# Patient Record
Sex: MALE | Race: WHITE | NOT HISPANIC OR LATINO | Employment: FULL TIME | URBAN - NONMETROPOLITAN AREA
[De-identification: names, ages, dates, MRNs, and addresses within clinical notes are randomized per-mention and may not be internally consistent; named-entity substitution may affect disease eponyms.]

---

## 2022-04-04 ENCOUNTER — OFFICE VISIT (OUTPATIENT)
Dept: LAB | Facility: HOSPITAL | Age: 62
End: 2022-04-04
Attending: STUDENT IN AN ORGANIZED HEALTH CARE EDUCATION/TRAINING PROGRAM
Payer: COMMERCIAL

## 2022-04-04 DIAGNOSIS — Z01.818 PRE-OP TESTING: ICD-10-CM

## 2022-04-04 PROCEDURE — 93005 ELECTROCARDIOGRAM TRACING: CPT

## 2022-04-05 LAB
ATRIAL RATE: 77 BPM
P AXIS: 73 DEGREES
PR INTERVAL: 158 MS
QRS AXIS: 61 DEGREES
QRSD INTERVAL: 84 MS
QT INTERVAL: 378 MS
QTC INTERVAL: 427 MS
T WAVE AXIS: 54 DEGREES
VENTRICULAR RATE: 77 BPM

## 2022-04-20 ENCOUNTER — ANESTHESIA EVENT (OUTPATIENT)
Dept: PERIOP | Facility: HOSPITAL | Age: 62
End: 2022-04-20
Payer: COMMERCIAL

## 2022-04-20 RX ORDER — ASPIRIN 81 MG/1
81 TABLET ORAL DAILY
COMMUNITY

## 2022-04-20 RX ORDER — ATORVASTATIN CALCIUM 20 MG/1
20 TABLET, FILM COATED ORAL DAILY
COMMUNITY

## 2022-04-20 RX ORDER — METHOCARBAMOL 750 MG/1
750 TABLET, FILM COATED ORAL 3 TIMES DAILY
COMMUNITY

## 2022-04-20 RX ORDER — DULOXETIN HYDROCHLORIDE 20 MG/1
20 CAPSULE, DELAYED RELEASE ORAL DAILY
COMMUNITY

## 2022-04-20 RX ORDER — GABAPENTIN 600 MG/1
600 TABLET ORAL 3 TIMES DAILY
COMMUNITY

## 2022-04-20 RX ORDER — LISINOPRIL 10 MG/1
10 TABLET ORAL DAILY
COMMUNITY

## 2022-04-20 RX ORDER — ZOLPIDEM TARTRATE 10 MG/1
10 TABLET ORAL
COMMUNITY

## 2022-04-20 NOTE — PRE-PROCEDURE INSTRUCTIONS
Pre-Surgery Instructions:   Medication Instructions    aspirin (ECOTRIN LOW STRENGTH) 81 mg EC tablet Pt was instructed to stop from today 4/20 until after sx    atorvastatin (LIPITOR) 20 mg tablet Take day of surgery   DULoxetine (CYMBALTA) 20 mg capsule Take day of surgery   gabapentin (NEURONTIN) 600 MG tablet Take day of surgery   lisinopril (ZESTRIL) 10 mg tablet Hold day of surgery   methocarbamol (ROBAXIN) 750 mg tablet Take day of surgery   zolpidem (Ambien) 10 mg tablet Take day of surgery  Pt takes most medications in the afternoon but will be taking gabapentin and robaxin with a small sip of water
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
## 2022-04-22 ENCOUNTER — ANESTHESIA (OUTPATIENT)
Dept: PERIOP | Facility: HOSPITAL | Age: 62
End: 2022-04-22
Payer: COMMERCIAL

## 2022-04-22 ENCOUNTER — HOSPITAL ENCOUNTER (OUTPATIENT)
Facility: HOSPITAL | Age: 62
Setting detail: OUTPATIENT SURGERY
Discharge: HOME | End: 2022-04-22
Attending: STUDENT IN AN ORGANIZED HEALTH CARE EDUCATION/TRAINING PROGRAM | Admitting: STUDENT IN AN ORGANIZED HEALTH CARE EDUCATION/TRAINING PROGRAM
Payer: COMMERCIAL

## 2022-04-22 VITALS
OXYGEN SATURATION: 94 % | WEIGHT: 162 LBS | HEART RATE: 77 BPM | SYSTOLIC BLOOD PRESSURE: 142 MMHG | BODY MASS INDEX: 21.94 KG/M2 | RESPIRATION RATE: 18 BRPM | HEIGHT: 72 IN | DIASTOLIC BLOOD PRESSURE: 77 MMHG | TEMPERATURE: 97.8 F

## 2022-04-22 DIAGNOSIS — K43.9 VENTRAL HERNIA WITHOUT OBSTRUCTION OR GANGRENE: Primary | ICD-10-CM

## 2022-04-22 DIAGNOSIS — R22.2 ABDOMINAL WALL MASS: ICD-10-CM

## 2022-04-22 PROBLEM — F17.200 SMOKING: Status: ACTIVE | Noted: 2022-04-22

## 2022-04-22 PROBLEM — J44.9 CHRONIC OBSTRUCTIVE PULMONARY DISEASE (HCC): Status: ACTIVE | Noted: 2022-04-22

## 2022-04-22 PROBLEM — I10 PRIMARY HYPERTENSION: Status: ACTIVE | Noted: 2022-04-22

## 2022-04-22 PROBLEM — E78.5 HYPERLIPIDEMIA: Status: ACTIVE | Noted: 2022-04-22

## 2022-04-22 PROBLEM — G89.29 CHRONIC PAIN: Status: ACTIVE | Noted: 2022-04-22

## 2022-04-22 PROCEDURE — 88302 TISSUE EXAM BY PATHOLOGIST: CPT | Performed by: PATHOLOGY

## 2022-04-22 PROCEDURE — C1781 MESH (IMPLANTABLE): HCPCS | Performed by: STUDENT IN AN ORGANIZED HEALTH CARE EDUCATION/TRAINING PROGRAM

## 2022-04-22 DEVICE — VENTRALIGHT™ ST MESH WITH ECHO 2™ POSITIONING SYSTEM15 CM / 6“ CIRCLE
Type: IMPLANTABLE DEVICE | Status: FUNCTIONAL
Brand: VENTRALIGHT ST MESH WITH ECHO 2 POSITIONING

## 2022-04-22 RX ORDER — ALBUTEROL SULFATE 2.5 MG/3ML
SOLUTION RESPIRATORY (INHALATION) AS NEEDED
Status: DISCONTINUED | OUTPATIENT
Start: 2022-04-22 | End: 2022-04-22

## 2022-04-22 RX ORDER — MAGNESIUM HYDROXIDE 1200 MG/15ML
LIQUID ORAL AS NEEDED
Status: DISCONTINUED | OUTPATIENT
Start: 2022-04-22 | End: 2022-04-22 | Stop reason: HOSPADM

## 2022-04-22 RX ORDER — DIPHENHYDRAMINE HYDROCHLORIDE 50 MG/ML
12.5 INJECTION INTRAMUSCULAR; INTRAVENOUS ONCE AS NEEDED
Status: DISCONTINUED | OUTPATIENT
Start: 2022-04-22 | End: 2022-04-22 | Stop reason: HOSPADM

## 2022-04-22 RX ORDER — MIDAZOLAM HYDROCHLORIDE 2 MG/2ML
INJECTION, SOLUTION INTRAMUSCULAR; INTRAVENOUS AS NEEDED
Status: DISCONTINUED | OUTPATIENT
Start: 2022-04-22 | End: 2022-04-22

## 2022-04-22 RX ORDER — ONDANSETRON 2 MG/ML
4 INJECTION INTRAMUSCULAR; INTRAVENOUS ONCE AS NEEDED
Status: DISCONTINUED | OUTPATIENT
Start: 2022-04-22 | End: 2022-04-22 | Stop reason: HOSPADM

## 2022-04-22 RX ORDER — ROCURONIUM BROMIDE 10 MG/ML
INJECTION, SOLUTION INTRAVENOUS AS NEEDED
Status: DISCONTINUED | OUTPATIENT
Start: 2022-04-22 | End: 2022-04-22

## 2022-04-22 RX ORDER — FENTANYL CITRATE 50 UG/ML
INJECTION, SOLUTION INTRAMUSCULAR; INTRAVENOUS AS NEEDED
Status: DISCONTINUED | OUTPATIENT
Start: 2022-04-22 | End: 2022-04-22

## 2022-04-22 RX ORDER — DEXAMETHASONE SODIUM PHOSPHATE 10 MG/ML
INJECTION, SOLUTION INTRAMUSCULAR; INTRAVENOUS AS NEEDED
Status: DISCONTINUED | OUTPATIENT
Start: 2022-04-22 | End: 2022-04-22

## 2022-04-22 RX ORDER — DEXMEDETOMIDINE HYDROCHLORIDE 100 UG/ML
INJECTION, SOLUTION INTRAVENOUS AS NEEDED
Status: DISCONTINUED | OUTPATIENT
Start: 2022-04-22 | End: 2022-04-22

## 2022-04-22 RX ORDER — LIDOCAINE HYDROCHLORIDE 10 MG/ML
INJECTION, SOLUTION EPIDURAL; INFILTRATION; INTRACAUDAL; PERINEURAL AS NEEDED
Status: DISCONTINUED | OUTPATIENT
Start: 2022-04-22 | End: 2022-04-22

## 2022-04-22 RX ORDER — BUPIVACAINE HYDROCHLORIDE AND EPINEPHRINE 2.5; 5 MG/ML; UG/ML
INJECTION, SOLUTION EPIDURAL; INFILTRATION; INTRACAUDAL; PERINEURAL AS NEEDED
Status: DISCONTINUED | OUTPATIENT
Start: 2022-04-22 | End: 2022-04-22 | Stop reason: HOSPADM

## 2022-04-22 RX ORDER — FENTANYL CITRATE/PF 50 MCG/ML
25 SYRINGE (ML) INJECTION
Status: DISCONTINUED | OUTPATIENT
Start: 2022-04-22 | End: 2022-04-22 | Stop reason: HOSPADM

## 2022-04-22 RX ORDER — HYDROMORPHONE HCL/PF 1 MG/ML
0.5 SYRINGE (ML) INJECTION
Status: DISCONTINUED | OUTPATIENT
Start: 2022-04-22 | End: 2022-04-22 | Stop reason: HOSPADM

## 2022-04-22 RX ORDER — HYDROMORPHONE HCL/PF 1 MG/ML
SYRINGE (ML) INJECTION AS NEEDED
Status: DISCONTINUED | OUTPATIENT
Start: 2022-04-22 | End: 2022-04-22

## 2022-04-22 RX ORDER — PROPOFOL 10 MG/ML
INJECTION, EMULSION INTRAVENOUS AS NEEDED
Status: DISCONTINUED | OUTPATIENT
Start: 2022-04-22 | End: 2022-04-22

## 2022-04-22 RX ORDER — ALBUTEROL SULFATE 2.5 MG/3ML
2.5 SOLUTION RESPIRATORY (INHALATION) ONCE AS NEEDED
Status: COMPLETED | OUTPATIENT
Start: 2022-04-22 | End: 2022-04-22

## 2022-04-22 RX ORDER — SODIUM CHLORIDE, SODIUM LACTATE, POTASSIUM CHLORIDE, CALCIUM CHLORIDE 600; 310; 30; 20 MG/100ML; MG/100ML; MG/100ML; MG/100ML
INJECTION, SOLUTION INTRAVENOUS CONTINUOUS PRN
Status: DISCONTINUED | OUTPATIENT
Start: 2022-04-22 | End: 2022-04-22

## 2022-04-22 RX ORDER — EPHEDRINE SULFATE 50 MG/ML
INJECTION INTRAVENOUS AS NEEDED
Status: DISCONTINUED | OUTPATIENT
Start: 2022-04-22 | End: 2022-04-22

## 2022-04-22 RX ORDER — CEFAZOLIN SODIUM 1 G/50ML
1000 SOLUTION INTRAVENOUS ONCE
Status: COMPLETED | OUTPATIENT
Start: 2022-04-22 | End: 2022-04-22

## 2022-04-22 RX ORDER — OXYCODONE HYDROCHLORIDE 5 MG/1
5 TABLET ORAL EVERY 4 HOURS PRN
Qty: 10 TABLET | Refills: 0 | Status: SHIPPED | OUTPATIENT
Start: 2022-04-22 | End: 2022-05-02

## 2022-04-22 RX ORDER — ALBUTEROL SULFATE 2.5 MG/3ML
2.5 SOLUTION RESPIRATORY (INHALATION) ONCE
Status: DISCONTINUED | OUTPATIENT
Start: 2022-04-22 | End: 2022-04-22 | Stop reason: HOSPADM

## 2022-04-22 RX ORDER — LABETALOL HYDROCHLORIDE 5 MG/ML
20 INJECTION, SOLUTION INTRAVENOUS
Status: DISCONTINUED | OUTPATIENT
Start: 2022-04-22 | End: 2022-04-22 | Stop reason: HOSPADM

## 2022-04-22 RX ORDER — KETOROLAC TROMETHAMINE 30 MG/ML
INJECTION, SOLUTION INTRAMUSCULAR; INTRAVENOUS AS NEEDED
Status: DISCONTINUED | OUTPATIENT
Start: 2022-04-22 | End: 2022-04-22

## 2022-04-22 RX ORDER — ALBUTEROL SULFATE 90 UG/1
AEROSOL, METERED RESPIRATORY (INHALATION) AS NEEDED
Status: DISCONTINUED | OUTPATIENT
Start: 2022-04-22 | End: 2022-04-22

## 2022-04-22 RX ADMIN — HYDROMORPHONE HYDROCHLORIDE 0.25 MG: 1 INJECTION, SOLUTION INTRAMUSCULAR; INTRAVENOUS; SUBCUTANEOUS at 12:18

## 2022-04-22 RX ADMIN — SUGAMMADEX 200 MG: 100 INJECTION, SOLUTION INTRAVENOUS at 12:23

## 2022-04-22 RX ADMIN — ALBUTEROL SULFATE 2.5 MG: 2.5 SOLUTION RESPIRATORY (INHALATION) at 09:57

## 2022-04-22 RX ADMIN — DEXMEDETOMIDINE HCL 8 MCG: 100 INJECTION INTRAVENOUS at 10:19

## 2022-04-22 RX ADMIN — ALBUTEROL SULFATE 4 PUFF: 90 AEROSOL, METERED RESPIRATORY (INHALATION) at 12:21

## 2022-04-22 RX ADMIN — DEXMEDETOMIDINE HCL 12 MCG: 100 INJECTION INTRAVENOUS at 10:06

## 2022-04-22 RX ADMIN — ROCURONIUM BROMIDE 50 MG: 50 INJECTION, SOLUTION INTRAVENOUS at 10:00

## 2022-04-22 RX ADMIN — MIDAZOLAM 2 MG: 1 INJECTION INTRAMUSCULAR; INTRAVENOUS at 09:55

## 2022-04-22 RX ADMIN — ALBUTEROL SULFATE 2 PUFF: 90 AEROSOL, METERED RESPIRATORY (INHALATION) at 09:58

## 2022-04-22 RX ADMIN — CEFAZOLIN SODIUM 1000 MG: 1 SOLUTION INTRAVENOUS at 10:00

## 2022-04-22 RX ADMIN — HYDROMORPHONE HYDROCHLORIDE 0.25 MG: 1 INJECTION, SOLUTION INTRAMUSCULAR; INTRAVENOUS; SUBCUTANEOUS at 11:59

## 2022-04-22 RX ADMIN — KETOROLAC TROMETHAMINE 30 MG: 30 INJECTION, SOLUTION INTRAMUSCULAR; INTRAVENOUS at 12:08

## 2022-04-22 RX ADMIN — ALBUTEROL SULFATE 2.5 MG: 2.5 SOLUTION RESPIRATORY (INHALATION) at 12:54

## 2022-04-22 RX ADMIN — FENTANYL CITRATE 100 MCG: 50 INJECTION, SOLUTION INTRAMUSCULAR; INTRAVENOUS at 12:33

## 2022-04-22 RX ADMIN — ROCURONIUM BROMIDE 20 MG: 50 INJECTION, SOLUTION INTRAVENOUS at 11:56

## 2022-04-22 RX ADMIN — HYDROMORPHONE HYDROCHLORIDE 0.5 MG: 1 INJECTION, SOLUTION INTRAMUSCULAR; INTRAVENOUS; SUBCUTANEOUS at 13:08

## 2022-04-22 RX ADMIN — SODIUM CHLORIDE, SODIUM LACTATE, POTASSIUM CHLORIDE, AND CALCIUM CHLORIDE: .6; .31; .03; .02 INJECTION, SOLUTION INTRAVENOUS at 09:45

## 2022-04-22 RX ADMIN — SODIUM CHLORIDE, SODIUM LACTATE, POTASSIUM CHLORIDE, CALCIUM CHLORIDE: 600; 310; 30; 20 INJECTION, SOLUTION INTRAVENOUS at 10:06

## 2022-04-22 RX ADMIN — FENTANYL CITRATE 100 MCG: 50 INJECTION, SOLUTION INTRAMUSCULAR; INTRAVENOUS at 09:58

## 2022-04-22 RX ADMIN — DEXAMETHASONE SODIUM PHOSPHATE 10 MG: 10 INJECTION INTRAMUSCULAR; INTRAVENOUS at 10:03

## 2022-04-22 RX ADMIN — PROPOFOL 200 MG: 10 INJECTION, EMULSION INTRAVENOUS at 09:59

## 2022-04-22 RX ADMIN — LIDOCAINE HYDROCHLORIDE 50 MG: 10 INJECTION, SOLUTION EPIDURAL; INFILTRATION; INTRACAUDAL at 09:59

## 2022-04-22 RX ADMIN — ROCURONIUM BROMIDE 10 MG: 50 INJECTION, SOLUTION INTRAVENOUS at 10:46

## 2022-04-22 RX ADMIN — HYDROMORPHONE HYDROCHLORIDE 0.5 MG: 1 INJECTION, SOLUTION INTRAMUSCULAR; INTRAVENOUS; SUBCUTANEOUS at 11:18

## 2022-04-22 RX ADMIN — EPHEDRINE SULFATE 5 MG: 50 INJECTION, SOLUTION INTRAVENOUS at 10:34

## 2022-04-22 RX ADMIN — ROCURONIUM BROMIDE 20 MG: 50 INJECTION, SOLUTION INTRAVENOUS at 11:18

## 2022-04-22 RX ADMIN — DEXMEDETOMIDINE HCL 4 MCG: 100 INJECTION INTRAVENOUS at 10:42

## 2022-04-22 RX ADMIN — FENTANYL CITRATE 25 MCG: 0.05 INJECTION, SOLUTION INTRAMUSCULAR; INTRAVENOUS at 13:01

## 2022-04-22 RX ADMIN — IPRATROPIUM BROMIDE 0.5 MG: 0.5 SOLUTION RESPIRATORY (INHALATION) at 14:00

## 2022-04-22 RX ADMIN — Medication 20 MG: at 13:15

## 2022-04-22 RX ADMIN — FENTANYL CITRATE 25 MCG: 0.05 INJECTION, SOLUTION INTRAMUSCULAR; INTRAVENOUS at 12:54

## 2022-04-22 NOTE — PROGRESS NOTES
Patient extremely restless  Rolling in bed stating he is having pain  Blood pressure elevated 185/86  Fentanyl given as ordered  Patient has audible wheezing with lungs scattered rhonchi and decreased    Allyne Budd aware and respiratory treatment given as ordered

## 2022-04-22 NOTE — OP NOTE
OPERATIVE REPORT  PATIENT NAME: Andrés Bonds    :  1960  MRN: [id number]  Pt Location: [facility] OR ROOM 01    SURGERY DATE: 2022    Surgeon(s) and Role:     * Cuco Baltazar DO - Primary     * Birgit Fernandez PA-C - Assisting    Preop Diagnosis:  Ventral hernia without incarceration    Post-Op Diagnosis Codes:    Same    Procedure(s) (LRB):  ROBOTIC ASSISTED VENTRAL HERNIA REPAIR WITH MESH (N/A)    Specimen(s):  ID Type Source Tests Collected by Time Destination   1 : hernia contents Tissue Soft Tissue, Other TISSUE EXAM Cuco Baltazar DO 2022 12:12 PM        Estimated Blood Loss:   Minimal    Drains:  NG/OG/Enteral Tube Orogastric 18 Fr Center mouth (Active)   Number of days: 0       Anesthesia Type:   General    Operative Indications:  Large abdominal wall mass in the right upper quadrant which is consistent with the hernia containing a significant amount of omentum on CT scan    Operative Findings: An approximately 3 cm round hernia of the right upper quadrant containing a large amount of omentum    Complications:   None    Procedure and Technique:  The patient is brought to the operating  Time-out was held the patient identified and procedure verified  Appropriate antibiotic prophylaxis and DVT prophylaxis was used  General anesthesia was administered  The area of the abdomen is prepped and draped usual sterile fashion using chlorhexidine solution  Local anesthesia 0 25% Marcaine with epinephrine was infiltrated in the left subcostal area  Incision was made using an 11 blade scalpel  The skin was grasped and elevated using towel clamps  A Veress needle was placed and confirmed to be intraperitoneal using a saline drop test   The abdomen is insufflated to 15 mmHg intraperitoneal pressure  A 12 mm trocar was placed  The abdomen is inspected  The hernia in the right upper quadrant containing omentum was identified  There were no other adhesions within the abdomen  An additional 12 mm trocar was placed in the left lateral abdomen after infiltration of local anesthesia under direct visualization  Additional 8 mm robotic trocar was placed in left lower quadrant in the same manner  The table was flexed and the patient was rotated towards the right side  The robotic cart was advanced into the operative field and appropriately docked  I then took control of the robotic console  The falciform ligament was taken down using electrocautery  Adhesions surrounding the omentum entering the hernia were taken down using electrocautery  The omentum was then reduced  Due to the amount of omentum within the hernia, this did take a prolonged period of time  Once the omentum was reduced small fragments of the omentum remained adherent to the hernia sac  These were taken down using sharp dissection electrocautery  The free pieces of omentum that were removed from the hernia sac were then placed within a 10 mm Endo-Catch bag and removed through the 12 mm trocar  This was sent as specimen  The hernia was then closed using a running suture of 1  Permanent V lock  A 15 cm circular Bard Ventralight ST mesh with the Echo 2 positioning system was selected  This was introduced into the peritoneum  The suture Passer was used to externalize the suture attached to the positioning system  The mesh appeared to be in good position  The mesh was then secured circumferentially using running sutures of 2-0 180 day absorbable V lock  The positioning system was removed  The repair appeared to be adequate  The abdomen is desufflated  Fascia of the 2 12 mm trocar sites were closed using a suture of 0 Vicryl  Skin was closed using 4-0 Vicryl in a subcuticular layer  The incisions were covered with skin glue  An abdominal binder was placed  The patient tolerated the procedure well transferred to recovery stable condition    At the end the procedure all needle instrument sponge counts were correct x2     I was present for the entire procedure and A physician assistant was required during the procedure for retraction tissue handling,dissection and suturing    Patient Disposition:  PACU       SIGNATURE: Robin Snyder DO  DATE: April 22, 2022  TIME: 12:17 PM

## 2022-04-22 NOTE — ANESTHESIA PREPROCEDURE EVALUATION
Procedure:  ROBOTIC ASSISTED VENTRAL HERNIA REPAIR WITH MESH (N/A Abdomen)  OPEN VENTRAL HERNIA REPAIR (N/A Abdomen)    Relevant Problems   ANESTHESIA (within normal limits)      CARDIO   (+) Hyperlipidemia   (+) Primary hypertension      ENDO   (-) Diabetes mellitus type 1 (HCC)   (-) Diabetes mellitus, type 2 (HCC)   (-) Hypothyroidism      GI/HEPATIC   (-) Dysphagia   (-) Gastroesophageal reflux disease      HEMATOLOGY   (-) Coagulation disorder (HCC)   (-) Hypercoagulable state (HCC)      NEURO/PSYCH  LE muscle pain  carpel tunnel in both hands   (+) Chronic pain   (-) CVA (cerebral vascular accident) ([facility] [address] )   (-) Seizures ([facility] [address] )      PULMONARY   (+) Chronic obstructive pulmonary disease ([facility] [address] )   (+) Smoking     Normal sinus rhythm  Normal ECG  No previous ECGs available  Confirmed by Serina Reyna ([id number]) on 4/5/2022 11:31:59 AM     Physical Exam    Airway  Comment: Muscular pain on left side of neck as per patient  Mallampati score: I  TM Distance: >3 FB  Neck ROM: full     Dental   upper dentures,     Cardiovascular  Rhythm: regular, Rate: normal,     Pulmonary  Breath sounds clear to auscultation,     Other Findings        Anesthesia Plan  ASA Score- 3     Anesthesia Type- general with ASA Monitors  Additional Monitors:   Airway Plan:           Plan Factors-Exercise tolerance (METS): >4 METS  Chart reviewed  EKG reviewed  Patient summary reviewed  Patient is a current smoker  Patient instructed to abstain from smoking on day of procedure  Patient did not smoke on day of surgery  Obstructive sleep apnea risk education given perioperatively  Induction-     Postoperative Plan- Plan for postoperative opioid use  Planned trial extubation    Informed Consent- Anesthetic plan and risks discussed with patient and spouse  I personally reviewed this patient with the CRNA  Discussed and agreed on the Anesthesia Plan with the CRNA  Lilliana Johnson

## 2022-04-22 NOTE — DISCHARGE INSTRUCTIONS
Chuck Decker Pair phone number is [phone]    DISCHARGE INSTRUCTIONS:   Medicines:  · Ibuprofen or acetaminophen:  These medicines decrease pain  Alternate tylenol and ibuprofen for pain control  · Prescription pain medication:  You have been prescribed Oxycodone  Take this as needed for severe pain  This medication is to be used only in the setting of severe pain not controlled by ibuprofen or acetaminophen  No further prescription pain medication will be prescribed  · Take your medicine as directed  Contact your healthcare provider if you think your medicine is not helping or if you have side effects  Tell him of her if you are allergic to any medicine  Keep a list of the medicines, vitamins, and herbs you take  Include the amounts, and when and why you take them  Bring the list or the pill bottles to follow-up visits  Carry your medicine list with you in case of an emergency  Follow up with your healthcare provider as scheduled    Self care:   · Activity:  Avoid lifting more then 10lb, bending, and straining for 6 weeks  If you have to cough, try to cough gently  Support the hernia by holding your hand or a pillow over it when coughing  · Wear the abdominal binder at all times except for when bathing  Attempt to wear it to sleep if comfortable  · Prevent constipation:  Straining to have a bowel movement may make your pain worse  Eat foods that are high in fiber and drink at least 8 glasses of water a day  A high-fiber diet includes whole grains, bran, cereals, and uncooked fruit and vegetables  Walking or other exercise can also help  Your healthcare provider may give you fiber medicine or a stool softener to help make your bowel movements softer and more regular  If you still do not move your bowels, take colace or milk of magnesia    · You may shower, do not soak in water for 2 weeks      Contact your healthcare provider if:   · You have nausea or vomiting  · You have severe pain  · You are constipated or have blood in your bowel movements  · You have questions or concerns about your condition or care    ·

## 2022-04-22 NOTE — ANESTHESIA POSTPROCEDURE EVALUATION
Post-Op Assessment Note    CV Status:  Stable  Pain Score: 4    Pain management: adequate     Mental Status:  Alert, awake, agitated and sleepy   Hydration Status:  Euvolemic   PONV Controlled:  Controlled   Airway Patency:  Patent      Post Op Vitals Reviewed: Yes      Staff: CRNA         No complications documented      BP   175/74   Temp 97 9   Pulse 93   Resp 24   SpO2 97% 4 L mask

## 2024-05-08 ENCOUNTER — HOSPITAL ENCOUNTER (EMERGENCY)
Facility: HOSPITAL | Age: 64
Discharge: ANOTHER HOSPITAL | End: 2024-05-08
Attending: EMERGENCY MEDICINE

## 2024-05-08 ENCOUNTER — APPOINTMENT (EMERGENCY)
Dept: CT IMAGING | Facility: HOSPITAL | Age: 64
End: 2024-05-08

## 2024-05-08 VITALS
TEMPERATURE: 98.6 F | HEART RATE: 90 BPM | RESPIRATION RATE: 18 BRPM | DIASTOLIC BLOOD PRESSURE: 60 MMHG | SYSTOLIC BLOOD PRESSURE: 112 MMHG | OXYGEN SATURATION: 96 %

## 2024-05-08 DIAGNOSIS — J69.0 PNEUMONITIS DUE TO INHALATION OF FOOD AND VOMIT (HCC): ICD-10-CM

## 2024-05-08 DIAGNOSIS — F17.200 SMOKING: ICD-10-CM

## 2024-05-08 DIAGNOSIS — K22.89 OTHER SPECIFIED DISEASE OF ESOPHAGUS: Primary | ICD-10-CM

## 2024-05-08 DIAGNOSIS — K22.3 ESOPHAGEAL PERFORATION: ICD-10-CM

## 2024-05-08 DIAGNOSIS — J69.0 ASPIRATION PNEUMONIA (HCC): ICD-10-CM

## 2024-05-08 DIAGNOSIS — K22.89 ESOPHAGEAL MASS: ICD-10-CM

## 2024-05-08 DIAGNOSIS — I10 PRIMARY HYPERTENSION: ICD-10-CM

## 2024-05-08 LAB
ALBUMIN SERPL BCP-MCNC: 3.4 G/DL (ref 3.5–5)
ALP SERPL-CCNC: 182 U/L (ref 34–104)
ALT SERPL W P-5'-P-CCNC: 26 U/L (ref 7–52)
ANION GAP SERPL CALCULATED.3IONS-SCNC: 16 MMOL/L (ref 4–13)
APTT PPP: 35 SECONDS (ref 23–37)
AST SERPL W P-5'-P-CCNC: 18 U/L (ref 13–39)
ATRIAL RATE: 114 BPM
ATRIAL RATE: 118 BPM
BILIRUB SERPL-MCNC: 1.07 MG/DL (ref 0.2–1)
BNP SERPL-MCNC: 144 PG/ML (ref 0–100)
BUN SERPL-MCNC: 41 MG/DL (ref 5–25)
CALCIUM ALBUM COR SERPL-MCNC: 9.4 MG/DL (ref 8.3–10.1)
CALCIUM SERPL-MCNC: 8.9 MG/DL (ref 8.4–10.2)
CARDIAC TROPONIN I PNL SERPL HS: 13 NG/L
CHLORIDE SERPL-SCNC: 98 MMOL/L (ref 96–108)
CK SERPL-CCNC: 28 U/L (ref 39–308)
CO2 SERPL-SCNC: 22 MMOL/L (ref 21–32)
CREAT SERPL-MCNC: 1.41 MG/DL (ref 0.6–1.3)
ERYTHROCYTE [DISTWIDTH] IN BLOOD BY AUTOMATED COUNT: 19 % (ref 11.6–15.1)
GFR SERPL CREATININE-BSD FRML MDRD: 53 ML/MIN/1.73SQ M
GLUCOSE SERPL-MCNC: 165 MG/DL (ref 65–140)
HCT VFR BLD AUTO: 27.9 % (ref 36.5–49.3)
HGB BLD-MCNC: 8.5 G/DL (ref 12–17)
INR PPP: 1.2 (ref 0.84–1.19)
LACTATE SERPL-SCNC: 2.4 MMOL/L (ref 0.5–2)
LACTATE SERPL-SCNC: 3.8 MMOL/L (ref 0.5–2)
LIPASE SERPL-CCNC: 8 U/L (ref 11–82)
MAGNESIUM SERPL-MCNC: 2.6 MG/DL (ref 1.9–2.7)
MCH RBC QN AUTO: 22.8 PG (ref 26.8–34.3)
MCHC RBC AUTO-ENTMCNC: 30.5 G/DL (ref 31.4–37.4)
MCV RBC AUTO: 75 FL (ref 82–98)
P AXIS: 72 DEGREES
P AXIS: 80 DEGREES
PLATELET # BLD AUTO: 1049 THOUSANDS/UL (ref 149–390)
PMV BLD AUTO: 9 FL (ref 8.9–12.7)
POTASSIUM SERPL-SCNC: 3.3 MMOL/L (ref 3.5–5.3)
PR INTERVAL: 130 MS
PR INTERVAL: 132 MS
PROCALCITONIN SERPL-MCNC: 3.68 NG/ML
PROT SERPL-MCNC: 8.7 G/DL (ref 6.4–8.4)
PROTHROMBIN TIME: 15.6 SECONDS (ref 11.6–14.5)
QRS AXIS: 66 DEGREES
QRS AXIS: 67 DEGREES
QRSD INTERVAL: 82 MS
QRSD INTERVAL: 84 MS
QT INTERVAL: 344 MS
QT INTERVAL: 348 MS
QTC INTERVAL: 479 MS
QTC INTERVAL: 482 MS
RBC # BLD AUTO: 3.72 MILLION/UL (ref 3.88–5.62)
SODIUM SERPL-SCNC: 136 MMOL/L (ref 135–147)
T WAVE AXIS: 66 DEGREES
T WAVE AXIS: 73 DEGREES
VENTRICULAR RATE: 114 BPM
VENTRICULAR RATE: 118 BPM
WBC # BLD AUTO: 33.13 THOUSAND/UL (ref 4.31–10.16)

## 2024-05-08 PROCEDURE — 85027 COMPLETE CBC AUTOMATED: CPT | Performed by: EMERGENCY MEDICINE

## 2024-05-08 PROCEDURE — 85007 BL SMEAR W/DIFF WBC COUNT: CPT | Performed by: EMERGENCY MEDICINE

## 2024-05-08 PROCEDURE — 88374 M/PHMTRC ALYS ISHQUANT/SEMIQ: CPT | Performed by: EMERGENCY MEDICINE

## 2024-05-08 PROCEDURE — 96366 THER/PROPH/DIAG IV INF ADDON: CPT

## 2024-05-08 PROCEDURE — 80053 COMPREHEN METABOLIC PANEL: CPT | Performed by: EMERGENCY MEDICINE

## 2024-05-08 PROCEDURE — 96367 TX/PROPH/DG ADDL SEQ IV INF: CPT

## 2024-05-08 PROCEDURE — 93005 ELECTROCARDIOGRAM TRACING: CPT

## 2024-05-08 PROCEDURE — 99284 EMERGENCY DEPT VISIT MOD MDM: CPT

## 2024-05-08 PROCEDURE — 85730 THROMBOPLASTIN TIME PARTIAL: CPT | Performed by: EMERGENCY MEDICINE

## 2024-05-08 PROCEDURE — 36415 COLL VENOUS BLD VENIPUNCTURE: CPT | Performed by: EMERGENCY MEDICINE

## 2024-05-08 PROCEDURE — 84484 ASSAY OF TROPONIN QUANT: CPT | Performed by: EMERGENCY MEDICINE

## 2024-05-08 PROCEDURE — 82550 ASSAY OF CK (CPK): CPT | Performed by: EMERGENCY MEDICINE

## 2024-05-08 PROCEDURE — 93010 ELECTROCARDIOGRAM REPORT: CPT | Performed by: INTERNAL MEDICINE

## 2024-05-08 PROCEDURE — 71260 CT THORAX DX C+: CPT

## 2024-05-08 PROCEDURE — 96365 THER/PROPH/DIAG IV INF INIT: CPT

## 2024-05-08 PROCEDURE — 83690 ASSAY OF LIPASE: CPT | Performed by: EMERGENCY MEDICINE

## 2024-05-08 PROCEDURE — 87040 BLOOD CULTURE FOR BACTERIA: CPT | Performed by: EMERGENCY MEDICINE

## 2024-05-08 PROCEDURE — 85610 PROTHROMBIN TIME: CPT | Performed by: EMERGENCY MEDICINE

## 2024-05-08 PROCEDURE — 70491 CT SOFT TISSUE NECK W/DYE: CPT

## 2024-05-08 PROCEDURE — 83605 ASSAY OF LACTIC ACID: CPT | Performed by: EMERGENCY MEDICINE

## 2024-05-08 PROCEDURE — 85025 COMPLETE CBC W/AUTO DIFF WBC: CPT | Performed by: EMERGENCY MEDICINE

## 2024-05-08 PROCEDURE — 99285 EMERGENCY DEPT VISIT HI MDM: CPT | Performed by: EMERGENCY MEDICINE

## 2024-05-08 PROCEDURE — 83880 ASSAY OF NATRIURETIC PEPTIDE: CPT | Performed by: EMERGENCY MEDICINE

## 2024-05-08 PROCEDURE — 84145 PROCALCITONIN (PCT): CPT | Performed by: EMERGENCY MEDICINE

## 2024-05-08 PROCEDURE — 83735 ASSAY OF MAGNESIUM: CPT | Performed by: EMERGENCY MEDICINE

## 2024-05-08 RX ORDER — AMLODIPINE BESYLATE 2.5 MG/1
2.5 TABLET ORAL DAILY
COMMUNITY

## 2024-05-08 RX ORDER — ALBUTEROL SULFATE 90 UG/1
2 AEROSOL, METERED RESPIRATORY (INHALATION) EVERY 6 HOURS PRN
COMMUNITY

## 2024-05-08 RX ORDER — CEFTRIAXONE 1 G/50ML
1000 INJECTION, SOLUTION INTRAVENOUS ONCE
Status: COMPLETED | OUTPATIENT
Start: 2024-05-08 | End: 2024-05-08

## 2024-05-08 RX ADMIN — IOHEXOL 85 ML: 350 INJECTION, SOLUTION INTRAVENOUS at 05:04

## 2024-05-08 RX ADMIN — CEFTRIAXONE 1000 MG: 1 INJECTION, SOLUTION INTRAVENOUS at 04:59

## 2024-05-08 RX ADMIN — SODIUM CHLORIDE 1000 ML: 0.9 INJECTION, SOLUTION INTRAVENOUS at 04:41

## 2024-05-08 RX ADMIN — SODIUM CHLORIDE, SODIUM LACTATE, POTASSIUM CHLORIDE, AND CALCIUM CHLORIDE 1000 ML: .6; .31; .03; .02 INJECTION, SOLUTION INTRAVENOUS at 03:54

## 2024-05-08 RX ADMIN — PIPERACILLIN SODIUM AND TAZOBACTAM SODIUM 4.5 G: 4; .5 INJECTION, POWDER, LYOPHILIZED, FOR SOLUTION INTRAVENOUS at 06:25

## 2024-05-08 NOTE — ED NOTES
PT. Able to stand and pivot to own ability.  Report given to EMS provider and pt. Care transferred to EMS      Jagjit Palacios RN  05/08/24 4045

## 2024-05-08 NOTE — ED CARE HANDOFF
Emergency Department Sign Out Note        Sign out and transfer of care from Dr Agosto. See Separate Emergency Department note.     The patient, Jairo Canchola, was evaluated by the previous provider for failure to thrive, anorexia.    Workup Completed:  History and physical, labs, CT neck soft tissue, CT chest with IV contrast    ED Course / Workup Pending (followup):  Pending transfer to [facility]                                  ED Course as of 05/08/24 1602   Wed May 08, 2024   0630 Spoke with Dr. Mcnally at [facility] who accept the patient for transfer     Procedures  Medical Decision Making  Amount and/or Complexity of Data Reviewed  Labs: ordered.  Radiology: ordered.    Risk  Prescription drug management.            Disposition  Final diagnoses:   Esophageal mass   Aspiration pneumonia (HCC)   Esophageal perforation - Possible     Time reflects when diagnosis was documented in both MDM as applicable and the Disposition within this note       Time User Action Codes Description Comment    5/8/2024  6:09 AM Tristan Agosto Add [K22.89] Esophageal mass     5/8/2024  6:09 AM Tristan Agosto Add [J69.0] Aspiration pneumonia (HCC)     5/8/2024  6:18 AM Tristan Agosto Add [K22.3] Esophageal perforation     5/8/2024  6:18 AM Tristan Agosto Modify [K22.3] Esophageal perforation Possible          ED Disposition       ED Disposition   Transfer to Another Facility-In Network    Condition   --    Date/Time   Wed May 8, 2024  6:10 AM    Comment   Jairo Canchola should be transferred out to [facility].               MD Documentation      Flowsheet Row Most Recent Value   Patient Condition The patient has been stabilized such that within reasonable medical probability, no material deterioration of the patient condition or the condition of the unborn child(nikki) is likely to result from the transfer   Accepting Physician Dr Mcnally   Accepting Facility Name, [facility]   Sending MD Margarita Caraballo    Provider Certification General risk, such as traffic hazards, adverse weather conditions, rough terrain or turbulence, possible failure of equipment (including vehicle or aircraft), or consequences of actions of persons outside the control of the transport personnel, Unanticipated needs of medical equipment and personnel during transport, Risk of worsening condition          RN Documentation      Flowsheet Row Most Recent Value   Accepting Facility Name, City & State  [facility] [place]          Follow-up Information    None       Discharge Medication List as of 5/8/2024 11:14 AM        CONTINUE these medications which have NOT CHANGED    Details   albuterol (PROVENTIL HFA,VENTOLIN HFA) 90 mcg/act inhaler Inhale 2 puffs every 6 (six) hours as needed for wheezing, Historical Med      amLODIPine (NORVASC) 2.5 mg tablet Take 2.5 mg by mouth daily, Historical Med      aspirin (ECOTRIN LOW STRENGTH) 81 mg EC tablet Take 81 mg by mouth daily, Historical Med      atorvastatin (LIPITOR) 20 mg tablet Take 20 mg by mouth daily, Historical Med      DULoxetine (CYMBALTA) 20 mg capsule Take 20 mg by mouth daily, Historical Med      gabapentin (NEURONTIN) 600 MG tablet Take 600 mg by mouth 3 (three) times a day, Historical Med      lisinopril (ZESTRIL) 10 mg tablet Take 10 mg by mouth daily, Historical Med      methocarbamol (ROBAXIN) 750 mg tablet Take 750 mg by mouth 3 (three) times a day, Historical Med      zolpidem (Ambien) 10 mg tablet Take 10 mg by mouth daily at bedtime as needed for sleep, Historical Med           No discharge procedures on file.       ED Provider  Electronically Signed by     Margarita Caraballo DO  05/08/24 0562

## 2024-05-08 NOTE — ED NOTES
[facility] [place] [address] [address], [place]: [phone]  F: [phone]    Reason for Visit:   Neena Lee is a 81 y.o. male who is seen in consultation at the request of Dr. Jaquelin Mcdaniels for evaluation of pancytopenia     Treatment History:   · None yet from us     History of Present Illness:   Patient is a 81 y.o. male seen for above    Was noted to have new leucopenia ( 2700) on 6/14/2021, and has had slowly worsening macrocytic anemia since 9/2018 ( Hb 12-> 9.8 g/dl) hence sent for evaluation. Prior work up did not show iron or B12 deficiency. He has no new medication changes. He did stopped his valsartan 45 days ago. He has no fevers, chills, sweats, he has no bleeding, has no SOB, is taking po iron every day ( this was self started). He has no new pain, no recent hospital admissions, no abdominal pain, loss of appetite. He had a normal EGD and colonoscopy in 2021.        Past Medical History:   Diagnosis Date    Anemia     Anxiety     Hypertension       Past Surgical History:   Procedure Laterality Date    COLONOSCOPY N/A 4/27/2021    tubular adenoma - performed by Luis Koroma MD at [address] HX CATARACT REMOVAL Bilateral 12/2015    HX CYST INCISION AND DRAINAGE Left 11/05/2019    L wrist abscess - see Clementine He note via [address] HX ENDOSCOPY  04/27/2021    esophagitis, no barretts     HX ORTHOPAEDIC  2000    fx with pin left leg-tibia    HX OTHER SURGICAL  05/2019    dental implants    HX VASECTOMY  1950's      Social History     Tobacco Use    Smoking status: Never Smoker    Smokeless tobacco: Never Used   Substance Use Topics    Alcohol use: No     Alcohol/week: 0.0 standard drinks      Family History   Problem Relation Age of Onset    Lung Disease Mother     Cancer Mother         lung    Alcohol abuse Mother     Cancer Father         prostate    Alcohol abuse Father     No Known Problems Sister      Current Wife called and I updated patients medication list with her. Unknown when last dose was taken.      Fabio Nazario RN  05/08/24 0759     Outpatient Medications   Medication Sig    ferrous sulfate (IRON PO) Take  by mouth daily.  varicella-zoster recombinant, PF, (Shingrix, PF,) 50 mcg/0.5 mL susr injection 0.5 ml IM once and then repeat in 2-6 months.  aliskiren (TEKTURNA) 150 mg tablet Take 1 Tablet by mouth daily.  multivitamin (MULTI-DELYN, WELLESSE) liqd Take  by mouth daily.  cloNIDine HCL (CATAPRES) 0.1 mg tablet 1 tab PO three times a day as needed when SBP > 160. No current facility-administered medications for this visit. Allergies   Allergen Reactions    Ace Inhibitors Angioedema     Tongue swelling    Hydrochlorothiazide Other (comments)     Joint stiffness    Aspirin Other (comments)     Low dose 81 mg ok but higher dose \"numbs liver\"        Review of Systems: A complete review of systems was obtained, negative except as described above. Physical Exam:     Visit Vitals  BP (!) 151/68 (BP 1 Location: Left upper arm, BP Patient Position: Sitting)   Pulse 89   Temp 98.9 °F (37.2 °C)   Resp 18   Wt 170 lb (77.1 kg)   SpO2 96%   BMI 25.85 kg/m²     ECOG PS: 1  General: No distress  Eyes: PERRLA, anicteric sclerae  HENT: Atraumatic, OP clear  Neck: Supple  Lymphatic: No cervical, supraclavicular, or inguinal adenopathy  Respiratory:  normal respiratory effort  CV: Normal rate,  no peripheral edema  GI: Soft, nontender, nondistended, no masses, no hepatomegaly, no splenomegaly  MS: Normal gait and station. Digits without clubbing or cyanosis. Skin: No rashes, ecchymoses, or petechiae. Normal temperature, turgor, and texture. Psych: Alert, oriented, appropriate affect, normal judgment/insight    Results:     Lab Results   Component Value Date/Time    WBC 2.7 (L) 06/14/2021 11:08 AM    HGB 9.8 (L) 06/14/2021 11:08 AM    HCT 30.2 (L) 06/14/2021 11:08 AM    PLATELET 717 84/16/0830 11:08 AM    .6 (H) 06/14/2021 11:08 AM    ABS.  NEUTROPHILS 2.4 01/11/2021 03:26 PM     Lab Results   Component Value Date/Time Sodium 138 01/11/2021 03:26 PM    Potassium 4.8 01/11/2021 03:26 PM    Chloride 105 01/11/2021 03:26 PM    CO2 30 01/11/2021 03:26 PM    Glucose 114 (H) 01/11/2021 03:26 PM    BUN 29 (H) 01/11/2021 03:26 PM    Creatinine 1.06 01/11/2021 03:26 PM    GFR est AA >60 01/11/2021 03:26 PM    GFR est non-AA >60 01/11/2021 03:26 PM    Calcium 9.9 01/11/2021 03:26 PM     Lab Results   Component Value Date/Time    Bilirubin, total 0.5 01/11/2021 03:26 PM    ALT (SGPT) 32 01/11/2021 03:26 PM    Alk. phosphatase 63 01/11/2021 03:26 PM    Protein, total 7.3 01/11/2021 03:26 PM    Albumin 4.0 01/11/2021 03:26 PM    Globulin 3.3 01/11/2021 03:26 PM       Lab Results   Component Value Date/Time    Reticulocyte count 0.6 (L) 05/28/2021 12:31 PM    Iron % saturation 47 05/28/2021 12:31 PM    TIBC 274 05/28/2021 12:31 PM    Ferritin 918 (H) 05/28/2021 12:31 PM    Vitamin B12 587 05/16/2018 02:04 PM    Folate >20.0 05/16/2018 02:04 PM    TSH 0.70 01/11/2021 03:26 PM     No results found for: INR, APTT, DDIMSQ, DDIME, [id number], Ester Nighat, FDPLT, Miguel [address], [address], Oren Wilson, [address] [facility] [address], [address], [id number], [id number], [id number], [id number], [id number], [id number], INREXT    Records reviewed and summarized above. Pathology report(s) reviewed above. Radiology report(s) reviewed above. Assessment:   1) Macrocytic anemia      Slowly progressive  No B12 def or iron deficiency  Concerning for a BM disease as retic is inappropriately low  Discussed the spectrum of disorders and rationale for work up below    2) Leucopenia  New  See evaluation    3) HTN    4) Anxiety      Plan:     · Cbc diff, smear, LD, Hapto, hep panel, HIV, TSH, Gammopathy eval and BM bx    RTC 3 weeks     I appreciate the opportunity to participate in Mr. Fred good.     Signed By: Jeronimo Ly MD

## 2024-05-08 NOTE — ED PROVIDER NOTES
History  Chief Complaint   Patient presents with    Medical Problem     Pt seen for throat problem and was told he needs his throat stretched. Pt unsure if he has an appointment scheduled in June for this. Pt family member states the pt has not eaten in several days.      Patient is a 63-year-old male with a history of COPD hypertension hyperlipidemia chronic pain presents to the emergency department complaining of difficulty swallowing reports this has been ongoing for about a year and was told in the past that he needs to have his esophagus stretched over the past week is having increased difficulty swallowing and not able to drink anything over the past few days and vomiting frequently right after any time he tries to drink liquids.        History provided by:  Patient and caregiver  Medical Problem  Associated symptoms: chest pain, cough, sore throat and vomiting    Associated symptoms: no abdominal pain, no congestion, no diarrhea, no fever, no headaches, no nausea and no shortness of breath        Prior to Admission Medications   Prescriptions Last Dose Informant Patient Reported? Taking?   DULoxetine (CYMBALTA) 20 mg capsule   Yes No   Sig: Take 20 mg by mouth daily   aspirin (ECOTRIN LOW STRENGTH) 81 mg EC tablet   Yes No   Sig: Take 81 mg by mouth daily   atorvastatin (LIPITOR) 20 mg tablet   Yes No   Sig: Take 20 mg by mouth daily   gabapentin (NEURONTIN) 600 MG tablet   Yes No   Sig: Take 600 mg by mouth 3 (three) times a day   lisinopril (ZESTRIL) 10 mg tablet   Yes No   Sig: Take 10 mg by mouth daily   methocarbamol (ROBAXIN) 750 mg tablet   Yes No   Sig: Take 750 mg by mouth 3 (three) times a day   zolpidem (Ambien) 10 mg tablet   Yes No   Sig: Take 10 mg by mouth daily at bedtime as needed for sleep      Facility-Administered Medications: None       Past Medical History:   Diagnosis Date    Anemia     Carpal tunnel syndrome on both sides     Chronic pain disorder     COPD (chronic obstructive  pulmonary disease) (HCC)     DDD (degenerative disc disease), lumbar     Hyperlipidemia     Hypertension     Insomnia     Pulmonary emphysema (HCC)     Ventral hernia        Past Surgical History:   Procedure Laterality Date    AMPUTATION Left     from trauma- nail be and tip of finger removed    EYE FOREIGN BODY REMOVAL Right     HERNIA REPAIR Right     inguinal    DC LAPS REPAIR HERNIA EXCEPT INCAL/INGUN REDUCIBLE N/A 4/22/2022    Procedure: ROBOTIC ASSISTED VENTRAL HERNIA REPAIR WITH MESH;  Surgeon: Massiel Sloan DO;  Location: [facility];  Service: General    REPAIR LACERATION      abdomen       History reviewed. No pertinent family history.  I have reviewed and agree with the history as documented.    E-Cigarette/Vaping    E-Cigarette Use Never User      E-Cigarette/Vaping Substances     Social History     Tobacco Use    Smoking status: Every Day     Current packs/day: 2.00     Types: Cigarettes    Smokeless tobacco: Never   Vaping Use    Vaping status: Never Used   Substance Use Topics    Alcohol use: Yes     Alcohol/week: 3.0 standard drinks of alcohol     Types: 3 Cans of beer per week     Comment: Pt reports 3 beers daily    Drug use: Never       Review of Systems   Constitutional:  Negative for chills and fever.   HENT:  Positive for sore throat, trouble swallowing and voice change. Negative for congestion.    Respiratory:  Positive for cough. Negative for shortness of breath.    Cardiovascular:  Positive for chest pain.   Gastrointestinal:  Positive for vomiting. Negative for abdominal pain, diarrhea and nausea.   Neurological:  Negative for weakness, numbness and headaches.   All other systems reviewed and are negative.      Physical Exam  Physical Exam  Vitals and nursing note reviewed.   Constitutional:       General: He is not in acute distress.     Appearance: Normal appearance. He is underweight. He is ill-appearing.   HENT:      Head: Normocephalic and atraumatic.      Nose: Nose  normal.   Eyes:      Conjunctiva/sclera: Conjunctivae normal.   Pulmonary:      Effort: Pulmonary effort is normal. No respiratory distress.   Skin:     General: Skin is dry.   Neurological:      General: No focal deficit present.      Mental Status: He is alert and oriented to person, place, and time.         Vital Signs  ED Triage Vitals   Temperature Pulse Respirations Blood Pressure SpO2   05/08/24 0344 05/08/24 0344 05/08/24 0344 05/08/24 0344 05/08/24 0344   98.6 °F (37 °C) (!) 112 18 96/51 93 %      Temp Source Heart Rate Source Patient Position - Orthostatic VS BP Location FiO2 (%)   05/08/24 0344 05/08/24 0344 05/08/24 0515 05/08/24 0515 --   Temporal Monitor Sitting Left arm       Pain Score       --                  Vitals:    05/08/24 0515 05/08/24 0530 05/08/24 0545 05/08/24 0600   BP: 109/55 97/51 110/53 102/51   Pulse: 97 95 100 99   Patient Position - Orthostatic VS: Sitting Sitting Sitting Sitting         Visual Acuity      ED Medications  Medications   piperacillin-tazobactam (ZOSYN) 4.5 g in sodium chloride 0.9 % 100 mL IVPB (has no administration in time range)   lactated ringers bolus 1,000 mL (0 mL Intravenous Stopped 5/8/24 0440)   sodium chloride 0.9 % bolus 1,000 mL (1,000 mL Intravenous New Bag 5/8/24 0441)   cefTRIAXone (ROCEPHIN) IVPB (premix in dextrose) 1,000 mg 50 mL (1,000 mg Intravenous New Bag 5/8/24 0459)   iohexol (OMNIPAQUE) 350 MG/ML injection (MULTI-DOSE) 85 mL (85 mL Intravenous Given 5/8/24 0504)   iohexol (OMNIPAQUE) 240 MG/ML solution 25 mL (25 mL Oral Given 5/8/24 0504)       Diagnostic Studies  Results Reviewed       Procedure Component Value Units Date/Time    Procalcitonin [[id number]]  (Abnormal) Collected: 05/08/24 0354    Lab Status: Final result Specimen: Blood from Arm, Right Updated: 05/08/24 0526     Procalcitonin 3.68 ng/ml     Lipase [[id number]]  (Abnormal) Collected: 05/08/24 0354    Lab Status: Final result Specimen: Blood from Arm, Right Updated: 05/08/24  0503     Lipase 8 u/L     Blood culture #2 [[id number]] Collected: 05/08/24 0438    Lab Status: In process Specimen: Blood from Arm, Right Updated: 05/08/24 0445    Blood culture #1 [[id number]] Collected: 05/08/24 0438    Lab Status: In process Specimen: Blood from Arm, Left Updated: 05/08/24 0445    HS Troponin 0hr (reflex protocol) [[id number]]  (Normal) Collected: 05/08/24 0354    Lab Status: Final result Specimen: Blood from Arm, Right Updated: 05/08/24 0436     hs TnI 0hr 13 ng/L     Lactic acid, plasma (w/reflex if result > 2.0) [[id number]]  (Abnormal) Collected: 05/08/24 0354    Lab Status: Final result Specimen: Blood from Arm, Right Updated: 05/08/24 0432     LACTIC ACID 3.8 mmol/L     Narrative:      Result may be elevated if tourniquet was used during collection.    Lactic acid 2 Hours [[id number]]     Lab Status: No result Specimen: Blood     Comprehensive metabolic panel [[id number]]  (Abnormal) Collected: 05/08/24 0354    Lab Status: Final result Specimen: Blood from Arm, Right Updated: 05/08/24 0432     Sodium 136 mmol/L      Potassium 3.3 mmol/L      Chloride 98 mmol/L      CO2 22 mmol/L      ANION GAP 16 mmol/L      BUN 41 mg/dL      Creatinine 1.41 mg/dL      Glucose 165 mg/dL      Calcium 8.9 mg/dL      Corrected Calcium 9.4 mg/dL      AST 18 U/L      ALT 26 U/L      Alkaline Phosphatase 182 U/L      Total Protein 8.7 g/dL      Albumin 3.4 g/dL      Total Bilirubin 1.07 mg/dL      eGFR 53 ml/min/1.73sq m     Narrative:      National Kidney Disease Foundation guidelines for Chronic Kidney Disease (CKD):     Stage 1 with normal or high GFR (GFR > 90 mL/min/1.73 square meters)    Stage 2 Mild CKD (GFR = 60-89 mL/min/1.73 square meters)    Stage 3A Moderate CKD (GFR = 45-59 mL/min/1.73 square meters)    Stage 3B Moderate CKD (GFR = 30-44 mL/min/1.73 square meters)    Stage 4 Severe CKD (GFR = 15-29 mL/min/1.73 square meters)    Stage 5 End Stage CKD (GFR <15 mL/min/1.73 square meters)  Note: GFR  Dr Langston calculation is accurate only with a steady state creatinine    Magnesium [[id number]]  (Normal) Collected: 05/08/24 0354    Lab Status: Final result Specimen: Blood from Arm, Right Updated: 05/08/24 0432     Magnesium 2.6 mg/dL     CK [[id number]]  (Abnormal) Collected: 05/08/24 0354    Lab Status: Final result Specimen: Blood from Arm, Right Updated: 05/08/24 0432     Total CK 28 U/L     Protime-INR [[id number]]  (Abnormal) Collected: 05/08/24 0354    Lab Status: Final result Specimen: Blood from Arm, Right Updated: 05/08/24 0425     Protime 15.6 seconds      INR 1.20    APTT [[id number]]  (Normal) Collected: 05/08/24 0354    Lab Status: Final result Specimen: Blood from Arm, Right Updated: 05/08/24 0425     PTT 35 seconds     CBC and differential [[id number]]  (Abnormal) Collected: 05/08/24 0354    Lab Status: Final result Specimen: Blood from Arm, Right Updated: 05/08/24 0425     WBC 33.13 Thousand/uL      RBC 3.72 Million/uL      Hemoglobin 8.5 g/dL      Hematocrit 27.9 %      MCV 75 fL      MCH 22.8 pg      MCHC 30.5 g/dL      RDW 19.0 %      MPV 9.0 fL      Platelets 1,049 Thousands/uL     Narrative:      This is an appended report.  These results have been appended to a previously verified report.    Manual Differential(PHLEBS Do Not Order) [[id number]] Collected: 05/08/24 0354    Lab Status: In process Specimen: Blood from Arm, Right Updated: 05/08/24 0424    B-Type Natriuretic Peptide(BNP) [[id number]] Collected: 05/08/24 0354    Lab Status: No result Specimen: Blood from Arm, Right                    CT soft tissue neck   Final Result by Dionte Gibbons DO (05/08 0538)      Large complex soft tissue mass within the posterior mediastinum appears to be esophageal in origin with a collection of air at the inferior aspect of this examination possibly extraluminal air. Pathologic adenopathy identified within the mediastinum and    at the thoracic inlet to the left of the trachea. Findings suggest a primary  "esophageal carcinoma.      Extensive emphysema within the visualized lung fields including extensive consolidation within the superior aspect of the left lower lobe suggestive of pneumonia.      See separate CT chest examination.      This examination was marked \"immediate notification\" in Epic in order to begin the standard process by which the radiology reading room liaison alerts the referring practitioner.            Workstation performed: [id number]         CT chest with contrast    (Results Pending)              Procedures  ECG 12 Lead Documentation Only    Date/Time: 5/8/2024 4:13 AM    Performed by: Tristan Agosto DO  Authorized by: Tristan Agosto DO    ECG reviewed by me, the ED Provider: yes    Patient location:  ED  Previous ECG:     Comparison to cardiac monitor: Yes    Quality:     Tracing quality:  Limited by artifact  Rate:     ECG rate:  118    ECG rate assessment: tachycardic    Rhythm:     Rhythm: sinus tachycardia    QRS:     QRS axis:  Normal    QRS intervals:  Normal  Conduction:     Conduction normal: lvh.    ST segments:     ST segments:  Non-specific  T waves:     T waves: non-specific             ED Course  ED Course as of 05/08/24 0618   Wed May 08, 2024   0610 Given extraluminal air seen on CT imaging concern for esophageal perforation associated with the esophageal mass seen as well as needed for advanced GI oncology and possible CT surgery services advised patient of need for transfer to tertiary care facility offered transfer to [facility] or [facility] patient prefers transfer to [facility].     0617 Care transferred to Dr. Caraballo pending callback from transfer center to arrange disposition.                                               Medical Decision Making  Problems Addressed:  Aspiration pneumonia (HCC): acute illness or injury  Esophageal mass: acute illness or injury  Esophageal perforation: acute illness or injury    Amount and/or Complexity of " Data Reviewed  Labs: ordered. Decision-making details documented in ED Course.  Radiology: ordered and independent interpretation performed. Decision-making details documented in ED Course.  ECG/medicine tests: ordered and independent interpretation performed. Decision-making details documented in ED Course.    Risk  Prescription drug management.             Disposition  Final diagnoses:   Esophageal mass   Aspiration pneumonia (HCC)   Esophageal perforation - Possible     Time reflects when diagnosis was documented in both MDM as applicable and the Disposition within this note       Time User Action Codes Description Comment    5/8/2024  6:09 AM Tristan Agosto Add [K22.89] Esophageal mass     5/8/2024  6:09 AM Tristan Agosto Add [J69.0] Aspiration pneumonia (HCC)     5/8/2024  6:18 AM Tristan Agosto Add [K22.3] Esophageal perforation     5/8/2024  6:18 AM Tristan Agosto Modify [K22.3] Esophageal perforation Possible          ED Disposition       ED Disposition   Transfer to Another Facility-In Network    Condition   --    Date/Time   Wed May 8, 2024  6:10 AM    Comment   Jairo Gamezies should be transferred out to [facility].               Follow-up Information    None         Patient's Medications   Discharge Prescriptions    No medications on file       No discharge procedures on file.    PDMP Review       None            ED Provider  Electronically Signed by             Tristan Agosto DO  05/08/24 0619

## 2024-05-08 NOTE — EMTALA/ACUTE CARE TRANSFER
[facility] EMERGENCY DEPARTMENT  [address] [facility] [address]  Dept: [phone]      EMTALA TRANSFER CONSENT    NAME Jairo Canchola                                         1960                              MRN [id number]    I have been informed of my rights regarding examination, treatment, and transfer   by Dr. Anna haynes. providers found    Benefits:  higher level of care    Risks:  worsening condition      Consent for Transfer:  I acknowledge that my medical condition has been evaluated and explained to me by the emergency department physician or other qualified medical person and/or my attending physician, who has recommended that I be transferred to the service of    at  . The above potential benefits of such transfer, the potential risks associated with such transfer, and the probable risks of not being transferred have been explained to me, and I fully understand them.  The doctor has explained that, in my case, the benefits of transfer outweigh the risks.  I agree to be transferred.    I authorize the performance of emergency medical procedures and treatments upon me in both transit and upon arrival at the receiving facility.  Additionally, I authorize the release of any and all medical records to the receiving facility and request they be transported with me, if possible.  I understand that the safest mode of transportation during a medical emergency is an ambulance and that the Hospital advocates the use of this mode of transport. Risks of traveling to the receiving facility by car, including absence of medical control, life sustaining equipment, such as oxygen, and medical personnel has been explained to me and I fully understand them.    (LÓPEZ CORRECT BOX BELOW)  [  ]  I consent to the stated transfer and to be transported by ambulance/helicopter.  [  ]  I consent to the stated transfer, but refuse transportation by ambulance and accept full responsibility for my  transportation by car.  I understand the risks of non-ambulance transfers and I exonerate the Hospital and its staff from any deterioration in my condition that results from this refusal.    X___________________________________________    DATE  24  TIME________  Signature of patient or legally responsible individual signing on patient behalf           RELATIONSHIP TO PATIENT_________________________          Provider Certification    NAME Jairo Canchola                                         1960                              MRN [id number]    A medical screening exam was performed on the above named patient.  Based on the examination:    Condition Necessitating Transfer The primary encounter diagnosis was Esophageal mass. Diagnoses of Aspiration pneumonia (HCC) and Esophageal perforation were also pertinent to this visit.    Patient Condition:      Reason for Transfer:      Transfer Requirements: Facility     Space available and qualified personnel available for treatment as acknowledged by    Agreed to accept transfer and to provide appropriate medical treatment as acknowledged by          Appropriate medical records of the examination and treatment of the patient are provided at the time of transfer   STAFF INITIAL WHEN COMPLETED _______  Transfer will be performed by qualified personnel from    and appropriate transfer equipment as required, including the use of necessary and appropriate life support measures.    Provider Certification: I have examined the patient and explained the following risks and benefits of being transferred/refusing transfer to the patient/family:         Based on these reasonable risks and benefits to the patient and/or the unborn child(nikki), and based upon the information available at the time of the patient’s examination, I certify that the medical benefits reasonably to be expected from the provision of appropriate medical treatments at another medical facility  outweigh the increasing risks, if any, to the individual’s medical condition, and in the case of labor to the unborn child, from effecting the transfer.    X____________________________________________ DATE 05/08/24        TIME_______      ORIGINAL - SEND TO MEDICAL RECORDS   COPY - SEND WITH PATIENT DURING TRANSFER

## 2024-05-12 LAB
BACTERIA BLD CULT: NORMAL
BACTERIA BLD CULT: NORMAL

## 2024-05-13 LAB
BACTERIA BLD CULT: NORMAL
BACTERIA BLD CULT: NORMAL

## 2024-05-17 LAB
LYMPHOCYTES # BLD AUTO: 6 % (ref 14–44)
MONOCYTES NFR BLD: 5 % (ref 4–12)
NEUTS BAND NFR BLD MANUAL: 2 % (ref 0–8)
NEUTS SEG NFR BLD AUTO: 87 % (ref 43–75)
OVALOCYTES BLD QL SMEAR: PRESENT
PATHOLOGY REVIEW: YES
PLATELET BLD QL SMEAR: ABNORMAL
POLYCHROMASIA BLD QL SMEAR: PRESENT
RBC MORPH BLD: PRESENT
TARGETS BLD QL SMEAR: PRESENT
TOTAL CELLS COUNTED SPEC: 100

## 2024-05-17 PROCEDURE — 85060 BLOOD SMEAR INTERPRETATION: CPT | Performed by: PATHOLOGY

## 2024-05-20 LAB — SCAN RESULT: NORMAL

## 2024-05-21 LAB — MISCELLANEOUS LAB TEST RESULT: NORMAL

## (undated) DEVICE — BETHLEHEM UNIVERSAL MINOR GEN: Brand: CARDINAL HEALTH

## (undated) DEVICE — LIGHT HANDLE COVER SLEEVE DISP BLUE STELLAR

## (undated) DEVICE — ALLENTOWN LAP CHOLE APP PACK: Brand: CARDINAL HEALTH

## (undated) DEVICE — NEPTUNE E-SEP SMOKE EVACUATION PENCIL, COATED, 70MM BLADE, PUSH BUTTON SWITCH: Brand: NEPTUNE E-SEP

## (undated) DEVICE — GLOVE INDICATOR PI UNDERGLOVE SZ 6.5 BLUE

## (undated) DEVICE — NEEDLE 25G X 1 1/2

## (undated) DEVICE — DRAPE EQUIPMENT RF WAND

## (undated) DEVICE — CHLORAPREP HI-LITE 26ML ORANGE

## (undated) DEVICE — GLOVE SRG BIOGEL ECLIPSE 6

## (undated) DEVICE — FENESTRATED BIPOLAR FORCEPS: Brand: ENDOWRIST;DAVINCI SI

## (undated) DEVICE — MEGASUTURECUT ND: Brand: ENDOWRIST;DAVINCI SI

## (undated) DEVICE — SUT VICRYL 0 REEL 54 IN J287G

## (undated) DEVICE — TIP COVER ACCESSORY

## (undated) DEVICE — LUBRICANT INST ELECTROLUBE ANTISTK WO PAD

## (undated) DEVICE — SUT VLOC PBT 1 GS-21 18=2 IN VLOCN0317

## (undated) DEVICE — PAD GROUNDING ADULT

## (undated) DEVICE — INTENDED FOR TISSUE SEPARATION, AND OTHER PROCEDURES THAT REQUIRE A SHARP SURGICAL BLADE TO PUNCTURE OR CUT.: Brand: BARD-PARKER SAFETY BLADES SIZE 15, STERILE

## (undated) DEVICE — GLOVE SRG BIOGEL 6

## (undated) DEVICE — ABSORBABLE WOUND CLOSURE DEVICE: Brand: V-LOC 180

## (undated) DEVICE — TROCAR: Brand: KII FIOS FIRST ENTRY

## (undated) DEVICE — TUBING SMOKE EVAC W/FILTRATION DEVICE PLUMEPORT ACTIV

## (undated) DEVICE — KIT ROBOT DRAPE DISP ACCESSORY KIT 3-ARM

## (undated) DEVICE — ADHESIVE SKIN HIGH VISCOSITY EXOFIN 1ML

## (undated) DEVICE — BIPOLAR CORD DISP

## (undated) DEVICE — INTENDED FOR TISSUE SEPARATION, AND OTHER PROCEDURES THAT REQUIRE A SHARP SURGICAL BLADE TO PUNCTURE OR CUT.: Brand: BARD-PARKER SAFETY BLADES SIZE 11, STERILE

## (undated) DEVICE — MONOPOLAR CURVED SCISSORS: Brand: ENDOWRIST;DAVINCI SI